# Patient Record
Sex: FEMALE | Race: OTHER | NOT HISPANIC OR LATINO | URBAN - METROPOLITAN AREA
[De-identification: names, ages, dates, MRNs, and addresses within clinical notes are randomized per-mention and may not be internally consistent; named-entity substitution may affect disease eponyms.]

---

## 2018-04-11 ENCOUNTER — OUTPATIENT (OUTPATIENT)
Dept: OUTPATIENT SERVICES | Facility: HOSPITAL | Age: 63
LOS: 1 days | Discharge: HOME | End: 2018-04-11

## 2018-04-11 VITALS
SYSTOLIC BLOOD PRESSURE: 143 MMHG | WEIGHT: 167.77 LBS | OXYGEN SATURATION: 99 % | HEART RATE: 66 BPM | RESPIRATION RATE: 15 BRPM | HEIGHT: 62 IN | DIASTOLIC BLOOD PRESSURE: 63 MMHG | TEMPERATURE: 98 F

## 2018-04-11 DIAGNOSIS — Z98.890 OTHER SPECIFIED POSTPROCEDURAL STATES: Chronic | ICD-10-CM

## 2018-04-11 LAB
ANION GAP SERPL CALC-SCNC: 11 MMOL/L — SIGNIFICANT CHANGE UP (ref 7–14)
APPEARANCE UR: CLEAR — SIGNIFICANT CHANGE UP
APTT BLD: 28.8 SEC — SIGNIFICANT CHANGE UP (ref 27–39.2)
BASOPHILS # BLD AUTO: 0.08 K/UL — SIGNIFICANT CHANGE UP (ref 0–0.2)
BASOPHILS NFR BLD AUTO: 0.8 % — SIGNIFICANT CHANGE UP (ref 0–1)
BILIRUB UR-MCNC: NEGATIVE — SIGNIFICANT CHANGE UP
BLD GP AB SCN SERPL QL: SIGNIFICANT CHANGE UP
BUN SERPL-MCNC: 17 MG/DL — SIGNIFICANT CHANGE UP (ref 10–20)
CALCIUM SERPL-MCNC: 9 MG/DL — SIGNIFICANT CHANGE UP (ref 8.5–10.1)
CHLORIDE SERPL-SCNC: 101 MMOL/L — SIGNIFICANT CHANGE UP (ref 98–110)
CO2 SERPL-SCNC: 29 MMOL/L — SIGNIFICANT CHANGE UP (ref 17–32)
COLOR SPEC: YELLOW — SIGNIFICANT CHANGE UP
CREAT SERPL-MCNC: 0.7 MG/DL — SIGNIFICANT CHANGE UP (ref 0.7–1.5)
DIFF PNL FLD: NEGATIVE — SIGNIFICANT CHANGE UP
EOSINOPHIL # BLD AUTO: 0.21 K/UL — SIGNIFICANT CHANGE UP (ref 0–0.7)
EOSINOPHIL NFR BLD AUTO: 2.1 % — SIGNIFICANT CHANGE UP (ref 0–8)
GLUCOSE SERPL-MCNC: 99 MG/DL — SIGNIFICANT CHANGE UP (ref 70–99)
GLUCOSE UR QL: NEGATIVE MG/DL — SIGNIFICANT CHANGE UP
HCT VFR BLD CALC: 36.6 % — LOW (ref 37–47)
HGB BLD-MCNC: 12.1 G/DL — SIGNIFICANT CHANGE UP (ref 12–16)
IMM GRANULOCYTES NFR BLD AUTO: 0.4 % — HIGH (ref 0.1–0.3)
INR BLD: 0.88 RATIO — SIGNIFICANT CHANGE UP (ref 0.65–1.3)
KETONES UR-MCNC: NEGATIVE — SIGNIFICANT CHANGE UP
LEUKOCYTE ESTERASE UR-ACNC: NEGATIVE — SIGNIFICANT CHANGE UP
LYMPHOCYTES # BLD AUTO: 3.07 K/UL — SIGNIFICANT CHANGE UP (ref 1.2–3.4)
LYMPHOCYTES # BLD AUTO: 30.3 % — SIGNIFICANT CHANGE UP (ref 20.5–51.1)
MCHC RBC-ENTMCNC: 30.1 PG — SIGNIFICANT CHANGE UP (ref 27–31)
MCHC RBC-ENTMCNC: 33.1 G/DL — SIGNIFICANT CHANGE UP (ref 32–37)
MCV RBC AUTO: 91 FL — SIGNIFICANT CHANGE UP (ref 81–99)
MONOCYTES # BLD AUTO: 0.81 K/UL — HIGH (ref 0.1–0.6)
MONOCYTES NFR BLD AUTO: 8 % — SIGNIFICANT CHANGE UP (ref 1.7–9.3)
NEUTROPHILS # BLD AUTO: 5.92 K/UL — SIGNIFICANT CHANGE UP (ref 1.4–6.5)
NEUTROPHILS NFR BLD AUTO: 58.4 % — SIGNIFICANT CHANGE UP (ref 42.2–75.2)
NITRITE UR-MCNC: NEGATIVE — SIGNIFICANT CHANGE UP
NRBC # BLD: 0 /100 WBCS — SIGNIFICANT CHANGE UP (ref 0–0)
PH UR: 7 — SIGNIFICANT CHANGE UP (ref 5–8)
PLATELET # BLD AUTO: 354 K/UL — SIGNIFICANT CHANGE UP (ref 130–400)
POTASSIUM SERPL-MCNC: 4.5 MMOL/L — SIGNIFICANT CHANGE UP (ref 3.5–5)
POTASSIUM SERPL-SCNC: 4.5 MMOL/L — SIGNIFICANT CHANGE UP (ref 3.5–5)
PROT UR-MCNC: NEGATIVE MG/DL — SIGNIFICANT CHANGE UP
PROTHROM AB SERPL-ACNC: 9.5 SEC — LOW (ref 9.95–12.87)
RBC # BLD: 4.02 M/UL — LOW (ref 4.2–5.4)
RBC # FLD: 14.7 % — HIGH (ref 11.5–14.5)
SODIUM SERPL-SCNC: 141 MMOL/L — SIGNIFICANT CHANGE UP (ref 135–146)
SP GR SPEC: 1.01 — SIGNIFICANT CHANGE UP (ref 1.01–1.03)
TYPE + AB SCN PNL BLD: SIGNIFICANT CHANGE UP
UROBILINOGEN FLD QL: 0.2 MG/DL — SIGNIFICANT CHANGE UP (ref 0.2–0.2)
WBC # BLD: 10.13 K/UL — SIGNIFICANT CHANGE UP (ref 4.8–10.8)
WBC # FLD AUTO: 10.13 K/UL — SIGNIFICANT CHANGE UP (ref 4.8–10.8)

## 2018-04-11 NOTE — H&P PST ADULT - REASON FOR ADMISSION
PT DENIES HAVING SOB, CP, PALPITATIONS, DYSURIA, UTI, URI AT PRESENT  EXERCISE TOLERANCE  2-3 FOS NO SOB.  PT DENIES HAVING ANY RASHES, BRUISES OR OPEN WOUNDS AT PRESENT.  AS PER THE PT, THIS IS HIS/HER COMPLETE MEDICAL AND SURGICAL HX, INCLUDING MEDICATIONS PRESCRIBED AND OVER THE COUNTER  PT PRESENTS TO PAST WITH H/O- LEFT KNEE OA.   PT SCHEDULED FOR LEFT- TKR. PT DENIES HAVING SOB, CP, PALPITATIONS, DYSURIA, UTI, URI AT PRESENT  EXERCISE TOLERANCE - 1 FOS NO SOB.  PT DENIES HAVING ANY RASHES, BRUISES OR OPEN WOUNDS AT PRESENT.  AS PER THE PT, THIS IS HIS/HER COMPLETE MEDICAL AND SURGICAL HX, INCLUDING MEDICATIONS PRESCRIBED AND OVER THE COUNTER  PT PRESENTS TO PAST WITH H/O- LEFT KNEE OA.   PT SCHEDULED FOR LEFT- TKR.

## 2018-04-11 NOTE — H&P PST ADULT - PMH
Arthritis  LOWER BACK  Back pain, unspecified back location, unspecified back pain laterality, unspecified chronicity    HTN (hypertension) Arthritis  LOWER BACK  Asthma  LAST EPISODE > 20YRS AGO  Back pain, unspecified back location, unspecified back pain laterality, unspecified chronicity    HTN (hypertension)

## 2018-04-11 NOTE — H&P PST ADULT - NSANTHOSAYNRD_GEN_A_CORE
No. TRICE screening performed.  STOP BANG Legend: 0-2 = LOW Risk; 3-4 = INTERMEDIATE Risk; 5-8 = HIGH Risk

## 2018-04-12 DIAGNOSIS — Z01.818 ENCOUNTER FOR OTHER PREPROCEDURAL EXAMINATION: ICD-10-CM

## 2018-04-12 DIAGNOSIS — M17.12 UNILATERAL PRIMARY OSTEOARTHRITIS, LEFT KNEE: ICD-10-CM

## 2018-04-12 LAB
CULTURE RESULTS: NO GROWTH — SIGNIFICANT CHANGE UP
SPECIMEN SOURCE: SIGNIFICANT CHANGE UP

## 2018-04-16 ENCOUNTER — INPATIENT (INPATIENT)
Facility: HOSPITAL | Age: 63
LOS: 3 days | Discharge: HOME | End: 2018-04-20
Attending: ORTHOPAEDIC SURGERY | Admitting: ORTHOPAEDIC SURGERY

## 2018-04-16 VITALS
HEART RATE: 84 BPM | DIASTOLIC BLOOD PRESSURE: 85 MMHG | RESPIRATION RATE: 18 BRPM | TEMPERATURE: 98 F | SYSTOLIC BLOOD PRESSURE: 149 MMHG | HEIGHT: 62 IN | WEIGHT: 177.03 LBS

## 2018-04-16 DIAGNOSIS — Z98.890 OTHER SPECIFIED POSTPROCEDURAL STATES: Chronic | ICD-10-CM

## 2018-04-16 RX ORDER — MORPHINE SULFATE 50 MG/1
2 CAPSULE, EXTENDED RELEASE ORAL
Qty: 0 | Refills: 0 | Status: DISCONTINUED | OUTPATIENT
Start: 2018-04-16 | End: 2018-04-16

## 2018-04-16 RX ORDER — MAGNESIUM CHLORIDE
400 CRYSTALS MISCELLANEOUS
Qty: 0 | Refills: 0 | COMMUNITY

## 2018-04-16 RX ORDER — SODIUM CHLORIDE 9 MG/ML
1000 INJECTION, SOLUTION INTRAVENOUS
Qty: 0 | Refills: 0 | Status: DISCONTINUED | OUTPATIENT
Start: 2018-04-16 | End: 2018-04-16

## 2018-04-16 RX ORDER — BENZOCAINE AND MENTHOL 5; 1 G/100ML; G/100ML
1 LIQUID ORAL
Qty: 0 | Refills: 0 | Status: DISCONTINUED | OUTPATIENT
Start: 2018-04-16 | End: 2018-04-20

## 2018-04-16 RX ORDER — MONTELUKAST 4 MG/1
10 TABLET, CHEWABLE ORAL DAILY
Qty: 0 | Refills: 0 | Status: DISCONTINUED | OUTPATIENT
Start: 2018-04-16 | End: 2018-04-20

## 2018-04-16 RX ORDER — FLUTICASONE PROPIONATE 220 MCG
1 AEROSOL WITH ADAPTER (GRAM) INHALATION
Qty: 0 | Refills: 0 | COMMUNITY

## 2018-04-16 RX ORDER — GABAPENTIN 400 MG/1
100 CAPSULE ORAL EVERY 8 HOURS
Qty: 0 | Refills: 0 | Status: DISCONTINUED | OUTPATIENT
Start: 2018-04-16 | End: 2018-04-20

## 2018-04-16 RX ORDER — ALPRAZOLAM 0.25 MG
0.25 TABLET ORAL EVERY 12 HOURS
Qty: 0 | Refills: 0 | Status: DISCONTINUED | OUTPATIENT
Start: 2018-04-16 | End: 2018-04-20

## 2018-04-16 RX ORDER — OXYCODONE HYDROCHLORIDE 5 MG/1
5 TABLET ORAL EVERY 4 HOURS
Qty: 0 | Refills: 0 | Status: DISCONTINUED | OUTPATIENT
Start: 2018-04-16 | End: 2018-04-20

## 2018-04-16 RX ORDER — DOCUSATE SODIUM 100 MG
100 CAPSULE ORAL THREE TIMES A DAY
Qty: 0 | Refills: 0 | Status: DISCONTINUED | OUTPATIENT
Start: 2018-04-16 | End: 2018-04-20

## 2018-04-16 RX ORDER — ALPRAZOLAM 0.25 MG
0.5 TABLET ORAL ONCE
Qty: 0 | Refills: 0 | Status: DISCONTINUED | OUTPATIENT
Start: 2018-04-16 | End: 2018-04-16

## 2018-04-16 RX ORDER — ACETAMINOPHEN 500 MG
650 TABLET ORAL EVERY 8 HOURS
Qty: 0 | Refills: 0 | Status: DISCONTINUED | OUTPATIENT
Start: 2018-04-16 | End: 2018-04-20

## 2018-04-16 RX ORDER — OMEPRAZOLE 10 MG/1
1 CAPSULE, DELAYED RELEASE ORAL
Qty: 0 | Refills: 0 | COMMUNITY

## 2018-04-16 RX ORDER — ONDANSETRON 8 MG/1
4 TABLET, FILM COATED ORAL ONCE
Qty: 0 | Refills: 0 | Status: DISCONTINUED | OUTPATIENT
Start: 2018-04-16 | End: 2018-04-16

## 2018-04-16 RX ORDER — CALCIUM CARBONATE 500(1250)
1 TABLET ORAL
Qty: 0 | Refills: 0 | COMMUNITY

## 2018-04-16 RX ORDER — OXYCODONE HYDROCHLORIDE 5 MG/1
10 TABLET ORAL EVERY 4 HOURS
Qty: 0 | Refills: 0 | Status: DISCONTINUED | OUTPATIENT
Start: 2018-04-16 | End: 2018-04-20

## 2018-04-16 RX ORDER — MORPHINE SULFATE 50 MG/1
4 CAPSULE, EXTENDED RELEASE ORAL
Qty: 0 | Refills: 0 | Status: DISCONTINUED | OUTPATIENT
Start: 2018-04-16 | End: 2018-04-16

## 2018-04-16 RX ORDER — BUDESONIDE AND FORMOTEROL FUMARATE DIHYDRATE 160; 4.5 UG/1; UG/1
2 AEROSOL RESPIRATORY (INHALATION)
Qty: 0 | Refills: 0 | Status: DISCONTINUED | OUTPATIENT
Start: 2018-04-16 | End: 2018-04-20

## 2018-04-16 RX ORDER — SODIUM CHLORIDE 9 MG/ML
1000 INJECTION INTRAMUSCULAR; INTRAVENOUS; SUBCUTANEOUS
Qty: 0 | Refills: 0 | Status: DISCONTINUED | OUTPATIENT
Start: 2018-04-16 | End: 2018-04-20

## 2018-04-16 RX ORDER — LORATADINE 10 MG/1
10 TABLET ORAL DAILY
Qty: 0 | Refills: 0 | Status: DISCONTINUED | OUTPATIENT
Start: 2018-04-16 | End: 2018-04-20

## 2018-04-16 RX ORDER — MEPERIDINE HYDROCHLORIDE 50 MG/ML
12.5 INJECTION INTRAMUSCULAR; INTRAVENOUS; SUBCUTANEOUS
Qty: 0 | Refills: 0 | Status: DISCONTINUED | OUTPATIENT
Start: 2018-04-16 | End: 2018-04-16

## 2018-04-16 RX ORDER — PREGABALIN 225 MG/1
1 CAPSULE ORAL
Qty: 0 | Refills: 0 | COMMUNITY

## 2018-04-16 RX ORDER — SENNA PLUS 8.6 MG/1
1 TABLET ORAL
Qty: 0 | Refills: 0 | Status: DISCONTINUED | OUTPATIENT
Start: 2018-04-16 | End: 2018-04-20

## 2018-04-16 RX ORDER — CEFAZOLIN SODIUM 1 G
1000 VIAL (EA) INJECTION EVERY 6 HOURS
Qty: 0 | Refills: 0 | Status: COMPLETED | OUTPATIENT
Start: 2018-04-16 | End: 2018-04-17

## 2018-04-16 RX ORDER — PANTOPRAZOLE SODIUM 20 MG/1
40 TABLET, DELAYED RELEASE ORAL
Qty: 0 | Refills: 0 | Status: DISCONTINUED | OUTPATIENT
Start: 2018-04-16 | End: 2018-04-20

## 2018-04-16 RX ORDER — ENOXAPARIN SODIUM 100 MG/ML
40 INJECTION SUBCUTANEOUS ONCE
Qty: 0 | Refills: 0 | Status: COMPLETED | OUTPATIENT
Start: 2018-04-17 | End: 2018-04-17

## 2018-04-16 RX ORDER — ALBUTEROL 90 UG/1
0 AEROSOL, METERED ORAL
Qty: 0 | Refills: 0 | COMMUNITY

## 2018-04-16 RX ORDER — ASPIRIN/CALCIUM CARB/MAGNESIUM 324 MG
325 TABLET ORAL EVERY 12 HOURS
Qty: 0 | Refills: 0 | Status: DISCONTINUED | OUTPATIENT
Start: 2018-04-17 | End: 2018-04-20

## 2018-04-16 RX ORDER — BUDESONIDE AND FORMOTEROL FUMARATE DIHYDRATE 160; 4.5 UG/1; UG/1
2 AEROSOL RESPIRATORY (INHALATION)
Qty: 0 | Refills: 0 | COMMUNITY

## 2018-04-16 RX ORDER — KETOROLAC TROMETHAMINE 30 MG/ML
15 SYRINGE (ML) INJECTION EVERY 8 HOURS
Qty: 0 | Refills: 0 | Status: DISCONTINUED | OUTPATIENT
Start: 2018-04-16 | End: 2018-04-17

## 2018-04-16 RX ORDER — ALBUTEROL 90 UG/1
1 AEROSOL, METERED ORAL DAILY
Qty: 0 | Refills: 0 | Status: DISCONTINUED | OUTPATIENT
Start: 2018-04-16 | End: 2018-04-20

## 2018-04-16 RX ORDER — MORPHINE SULFATE 50 MG/1
2 CAPSULE, EXTENDED RELEASE ORAL EVERY 4 HOURS
Qty: 0 | Refills: 0 | Status: DISCONTINUED | OUTPATIENT
Start: 2018-04-16 | End: 2018-04-20

## 2018-04-16 RX ORDER — PREGABALIN 225 MG/1
500 CAPSULE ORAL DAILY
Qty: 0 | Refills: 0 | Status: DISCONTINUED | OUTPATIENT
Start: 2018-04-16 | End: 2018-04-20

## 2018-04-16 RX ORDER — METOCLOPRAMIDE HCL 10 MG
10 TABLET ORAL EVERY 6 HOURS
Qty: 0 | Refills: 0 | Status: DISCONTINUED | OUTPATIENT
Start: 2018-04-16 | End: 2018-04-20

## 2018-04-16 RX ORDER — LORATADINE 10 MG/1
1 TABLET ORAL
Qty: 0 | Refills: 0 | COMMUNITY

## 2018-04-16 RX ORDER — MONTELUKAST 4 MG/1
1 TABLET, CHEWABLE ORAL
Qty: 0 | Refills: 0 | COMMUNITY

## 2018-04-16 RX ORDER — FERROUS SULFATE 325(65) MG
325 TABLET ORAL
Qty: 0 | Refills: 0 | Status: DISCONTINUED | OUTPATIENT
Start: 2018-04-16 | End: 2018-04-20

## 2018-04-16 RX ADMIN — MORPHINE SULFATE 4 MILLIGRAM(S): 50 CAPSULE, EXTENDED RELEASE ORAL at 19:00

## 2018-04-16 RX ADMIN — Medication 0.5 MILLIGRAM(S): at 20:05

## 2018-04-16 RX ADMIN — Medication 100 MILLIGRAM(S): at 22:59

## 2018-04-16 RX ADMIN — Medication 100 MILLIGRAM(S): at 22:58

## 2018-04-16 RX ADMIN — BENZOCAINE AND MENTHOL 1 LOZENGE: 5; 1 LIQUID ORAL at 23:37

## 2018-04-16 RX ADMIN — MEPERIDINE HYDROCHLORIDE 12.5 MILLIGRAM(S): 50 INJECTION INTRAMUSCULAR; INTRAVENOUS; SUBCUTANEOUS at 18:15

## 2018-04-16 RX ADMIN — MEPERIDINE HYDROCHLORIDE 12.5 MILLIGRAM(S): 50 INJECTION INTRAMUSCULAR; INTRAVENOUS; SUBCUTANEOUS at 18:25

## 2018-04-16 RX ADMIN — GABAPENTIN 100 MILLIGRAM(S): 400 CAPSULE ORAL at 23:03

## 2018-04-16 RX ADMIN — Medication 650 MILLIGRAM(S): at 22:58

## 2018-04-16 RX ADMIN — SODIUM CHLORIDE 120 MILLILITER(S): 9 INJECTION, SOLUTION INTRAVENOUS at 18:39

## 2018-04-16 RX ADMIN — MORPHINE SULFATE 4 MILLIGRAM(S): 50 CAPSULE, EXTENDED RELEASE ORAL at 18:05

## 2018-04-16 RX ADMIN — MORPHINE SULFATE 4 MILLIGRAM(S): 50 CAPSULE, EXTENDED RELEASE ORAL at 18:15

## 2018-04-16 RX ADMIN — Medication 10 MILLIGRAM(S): at 23:40

## 2018-04-16 RX ADMIN — Medication 15 MILLIGRAM(S): at 23:01

## 2018-04-16 NOTE — ASU PATIENT PROFILE, ADULT - PMH
Arthritis  LOWER BACK  Asthma  LAST EPISODE > 20YRS AGO  Back pain, unspecified back location, unspecified back pain laterality, unspecified chronicity    HTN (hypertension)

## 2018-04-16 NOTE — PROGRESS NOTE ADULT - SUBJECTIVE AND OBJECTIVE BOX
pt seems very anxious no chest pain sob   Vital Signs Last 24 Hrs  T(C): 37 (16 Apr 2018 19:30), Max: 37 (16 Apr 2018 19:30)  T(F): 98.6 (16 Apr 2018 19:30), Max: 98.6 (16 Apr 2018 19:30)  HR: 103 (16 Apr 2018 20:00) (84 - 107)  BP: 134/76 (16 Apr 2018 20:00) (120/74 - 149/85)  BP(mean): --  RR: 23 (16 Apr 2018 20:00) (16 - 25)  SpO2: 99% (16 Apr 2018 20:00) (98% - 100%)  ekg preop hr81   post op in the pacu 105 sinus  xanax given   1 hour later pt much better hr under 100   bp stable  am labs continue obs

## 2018-04-16 NOTE — CHART NOTE - NSCHARTNOTEFT_GEN_A_CORE
PACU ANESTHESIA ADMISSION NOTE      Procedure: Left total knee replacement  Post op diagnosis:  left osteoarthritis    ____  Intubated  TV:______       Rate: ______      FiO2: ______    _x___  Patent Airway    _x___  Full return of protective reflexes    __  Full recovery from anesthesia / back to baseline status    Vitals:  T97.9  HR: 106  BP: 134/75  RR: 21  SpO2: 98    Mental Status:  _x___ Awake   _____ Alert   _____ Drowsy   _____ Sedated    Nausea/Vomiting:  _x___  NO       ______Yes,   See Post - Op Orders         Pain Scale (0-10):  __0___    Treatment: _x___ None    ____ See Post - Op/PCA Orders    Post - Operative Fluids:   __x__ Oral   ____ See Post - Op Orders    Plan: Discharge:   __Home       __x___Floor     _____Critical Care    _____  Other:_________________    Comments: spontaneously breathing hemodynamically stable   No anesthesia issues or complications noted.  Discharge to floor when criteria met.

## 2018-04-16 NOTE — PROGRESS NOTE ADULT - SUBJECTIVE AND OBJECTIVE BOX
TKA ORTHOPEDIC POST OP CHECK    T(C): 36.5 (04-16-18 @ 17:54), Max: 36.5 (04-16-18 @ 17:54)  HR: 85 (04-16-18 @ 18:45) (84 - 107)  BP: 137/73 (04-16-18 @ 18:45) (134/75 - 149/85)  RR: 16 (04-16-18 @ 18:45) (16 - 25)  SpO2: 100% (04-16-18 @ 18:30) (100% - 100%)      p[reop h/h 12/36          S/P TKA ARTHROPLASTY  PAIN CONTROLLED  VSS   A&O X3  DRESSING C/D/I  NVI  ANTIBIOTICS INFUSED  DVT PROPHYLAXIS ORDERED ASA  ENCOURAGE INCENTIVE SPIROMETRY  AM LABS  REHAB TO BEGIN IN THE AM WBAT  D/C PLANNING

## 2018-04-17 LAB
ANION GAP SERPL CALC-SCNC: 16 MMOL/L — HIGH (ref 7–14)
BASOPHILS # BLD AUTO: 0.01 K/UL — SIGNIFICANT CHANGE UP (ref 0–0.2)
BASOPHILS NFR BLD AUTO: 0.1 % — SIGNIFICANT CHANGE UP (ref 0–1)
BUN SERPL-MCNC: 12 MG/DL — SIGNIFICANT CHANGE UP (ref 10–20)
CALCIUM SERPL-MCNC: 8.7 MG/DL — SIGNIFICANT CHANGE UP (ref 8.5–10.1)
CHLORIDE SERPL-SCNC: 101 MMOL/L — SIGNIFICANT CHANGE UP (ref 98–110)
CO2 SERPL-SCNC: 22 MMOL/L — SIGNIFICANT CHANGE UP (ref 17–32)
CREAT SERPL-MCNC: 0.8 MG/DL — SIGNIFICANT CHANGE UP (ref 0.7–1.5)
EOSINOPHIL # BLD AUTO: 0 K/UL — SIGNIFICANT CHANGE UP (ref 0–0.7)
EOSINOPHIL NFR BLD AUTO: 0 % — SIGNIFICANT CHANGE UP (ref 0–8)
GLUCOSE SERPL-MCNC: 172 MG/DL — HIGH (ref 70–99)
HCT VFR BLD CALC: 36.2 % — LOW (ref 37–47)
HGB BLD-MCNC: 11.6 G/DL — LOW (ref 12–16)
IMM GRANULOCYTES NFR BLD AUTO: 0.4 % — HIGH (ref 0.1–0.3)
LYMPHOCYTES # BLD AUTO: 0.88 K/UL — LOW (ref 1.2–3.4)
LYMPHOCYTES # BLD AUTO: 7.2 % — LOW (ref 20.5–51.1)
MCHC RBC-ENTMCNC: 29.5 PG — SIGNIFICANT CHANGE UP (ref 27–31)
MCHC RBC-ENTMCNC: 32 G/DL — SIGNIFICANT CHANGE UP (ref 32–37)
MCV RBC AUTO: 92.1 FL — SIGNIFICANT CHANGE UP (ref 81–99)
MONOCYTES # BLD AUTO: 0.22 K/UL — SIGNIFICANT CHANGE UP (ref 0.1–0.6)
MONOCYTES NFR BLD AUTO: 1.8 % — SIGNIFICANT CHANGE UP (ref 1.7–9.3)
NEUTROPHILS # BLD AUTO: 11.1 K/UL — HIGH (ref 1.4–6.5)
NEUTROPHILS NFR BLD AUTO: 90.5 % — HIGH (ref 42.2–75.2)
NRBC # BLD: 0 /100 WBCS — SIGNIFICANT CHANGE UP (ref 0–0)
PLATELET # BLD AUTO: 291 K/UL — SIGNIFICANT CHANGE UP (ref 130–400)
POTASSIUM SERPL-MCNC: 4.8 MMOL/L — SIGNIFICANT CHANGE UP (ref 3.5–5)
POTASSIUM SERPL-SCNC: 4.8 MMOL/L — SIGNIFICANT CHANGE UP (ref 3.5–5)
RBC # BLD: 3.93 M/UL — LOW (ref 4.2–5.4)
RBC # FLD: 14.7 % — HIGH (ref 11.5–14.5)
SODIUM SERPL-SCNC: 139 MMOL/L — SIGNIFICANT CHANGE UP (ref 135–146)
WBC # BLD: 12.26 K/UL — HIGH (ref 4.8–10.8)
WBC # FLD AUTO: 12.26 K/UL — HIGH (ref 4.8–10.8)

## 2018-04-17 RX ADMIN — Medication 100 MILLIGRAM(S): at 05:21

## 2018-04-17 RX ADMIN — Medication 650 MILLIGRAM(S): at 21:24

## 2018-04-17 RX ADMIN — BENZOCAINE AND MENTHOL 1 LOZENGE: 5; 1 LIQUID ORAL at 05:24

## 2018-04-17 RX ADMIN — ALBUTEROL 1 PUFF(S): 90 AEROSOL, METERED ORAL at 09:25

## 2018-04-17 RX ADMIN — Medication 325 MILLIGRAM(S): at 17:11

## 2018-04-17 RX ADMIN — Medication 1 TABLET(S): at 11:00

## 2018-04-17 RX ADMIN — Medication 650 MILLIGRAM(S): at 05:21

## 2018-04-17 RX ADMIN — OXYCODONE HYDROCHLORIDE 10 MILLIGRAM(S): 5 TABLET ORAL at 13:36

## 2018-04-17 RX ADMIN — BENZOCAINE AND MENTHOL 1 LOZENGE: 5; 1 LIQUID ORAL at 17:12

## 2018-04-17 RX ADMIN — Medication 15 MILLIGRAM(S): at 14:20

## 2018-04-17 RX ADMIN — PREGABALIN 500 MICROGRAM(S): 225 CAPSULE ORAL at 11:00

## 2018-04-17 RX ADMIN — SENNA PLUS 1 TABLET(S): 8.6 TABLET ORAL at 05:21

## 2018-04-17 RX ADMIN — GABAPENTIN 100 MILLIGRAM(S): 400 CAPSULE ORAL at 13:36

## 2018-04-17 RX ADMIN — BENZOCAINE AND MENTHOL 1 LOZENGE: 5; 1 LIQUID ORAL at 11:01

## 2018-04-17 RX ADMIN — SENNA PLUS 1 TABLET(S): 8.6 TABLET ORAL at 17:10

## 2018-04-17 RX ADMIN — Medication 100 MILLIGRAM(S): at 13:36

## 2018-04-17 RX ADMIN — Medication 325 MILLIGRAM(S): at 08:40

## 2018-04-17 RX ADMIN — LORATADINE 10 MILLIGRAM(S): 10 TABLET ORAL at 11:01

## 2018-04-17 RX ADMIN — OXYCODONE HYDROCHLORIDE 10 MILLIGRAM(S): 5 TABLET ORAL at 10:39

## 2018-04-17 RX ADMIN — Medication 15 MILLIGRAM(S): at 13:33

## 2018-04-17 RX ADMIN — Medication 650 MILLIGRAM(S): at 21:25

## 2018-04-17 RX ADMIN — GABAPENTIN 100 MILLIGRAM(S): 400 CAPSULE ORAL at 21:24

## 2018-04-17 RX ADMIN — MONTELUKAST 10 MILLIGRAM(S): 4 TABLET, CHEWABLE ORAL at 11:00

## 2018-04-17 RX ADMIN — Medication 0.25 MILLIGRAM(S): at 05:24

## 2018-04-17 RX ADMIN — ENOXAPARIN SODIUM 40 MILLIGRAM(S): 100 INJECTION SUBCUTANEOUS at 00:14

## 2018-04-17 RX ADMIN — Medication 325 MILLIGRAM(S): at 05:21

## 2018-04-17 RX ADMIN — Medication 100 MILLIGRAM(S): at 05:22

## 2018-04-17 RX ADMIN — Medication 325 MILLIGRAM(S): at 17:10

## 2018-04-17 RX ADMIN — Medication 10 MILLIGRAM(S): at 05:20

## 2018-04-17 RX ADMIN — Medication 650 MILLIGRAM(S): at 13:35

## 2018-04-17 RX ADMIN — Medication 100 MILLIGRAM(S): at 21:23

## 2018-04-17 RX ADMIN — BUDESONIDE AND FORMOTEROL FUMARATE DIHYDRATE 2 PUFF(S): 160; 4.5 AEROSOL RESPIRATORY (INHALATION) at 21:23

## 2018-04-17 RX ADMIN — BUDESONIDE AND FORMOTEROL FUMARATE DIHYDRATE 2 PUFF(S): 160; 4.5 AEROSOL RESPIRATORY (INHALATION) at 08:41

## 2018-04-17 RX ADMIN — SODIUM CHLORIDE 50 MILLILITER(S): 9 INJECTION INTRAMUSCULAR; INTRAVENOUS; SUBCUTANEOUS at 00:15

## 2018-04-17 RX ADMIN — Medication 0.25 MILLIGRAM(S): at 17:12

## 2018-04-17 RX ADMIN — PANTOPRAZOLE SODIUM 40 MILLIGRAM(S): 20 TABLET, DELAYED RELEASE ORAL at 08:40

## 2018-04-17 RX ADMIN — Medication 325 MILLIGRAM(S): at 13:35

## 2018-04-17 RX ADMIN — GABAPENTIN 100 MILLIGRAM(S): 400 CAPSULE ORAL at 05:21

## 2018-04-17 RX ADMIN — Medication 650 MILLIGRAM(S): at 14:20

## 2018-04-17 RX ADMIN — Medication 15 MILLIGRAM(S): at 05:21

## 2018-04-17 NOTE — PHYSICAL THERAPY INITIAL EVALUATION ADULT - GENERAL OBSERVATIONS, REHAB EVAL
10:30-11:10; Pt encountered in bed, agreeable to PT. + hemovac, IVL by JULIETTE Puente, pain meds administered. + O2, SpO: 99%, SpO2 on RA after amb: 99%. JULIETTE Puente aware O2 removed.

## 2018-04-17 NOTE — OCCUPATIONAL THERAPY INITIAL EVALUATION ADULT - GENERAL OBSERVATIONS, REHAB EVAL
Pt. encountered semi sweet in NAD. + IV, + Bed Alarm. Pt. cooperative to OT evaluation. Seen 13:20-13:53

## 2018-04-17 NOTE — CONSULT NOTE ADULT - ASSESSMENT
IMPRESSION: Rehab of L TKR    PRECAUTIONS: [    ] Cardiac  [    ] Respiratory  [    ] Seizures [    ] Contact Isolation  [    ] Droplet Isolation  [    ] Other    Weight Bearing Status:     RECOMMENDATION:    Out of Bed to Chair     DVT/Decubiti Prophylaxis    REHAB PLAN:     [   x  ] Bedside P/T 3-5 times a week   [ x    ] Bedside O/T  2-3 times a week   [     ] No Rehab Therapy Indicated   [     ]  Speech Therapy   Conditioning/ROM                                 ADL  Bed Mobility                                            Conditioning/ROM  Transfers                                                  Bed Mobility  Sitting /Standing Balance                      Transfers                                        Gait Training                                            Sitting/Standing Balance  Stair Training [   ]Applicable                 Home equipment Eval                                                                     Splinting  [   ] Only      GOALS:   ADL   [x    ]   Independent         Transfers  [  x  ] Independent            Ambulation  [  x   ] Independent     [x     ] With device                            [    ]  CG                                               [    ]  CG                                                    [     ] CG                            [    ] Min A                                          [    ] Min A                                                [     ] Min  A          DISCHARGE PLAN:   [     ]  Good candidate for Intensive Rehabilitation/Hospital based-[facility]                                             Will tolerate 3hrs Intensive Rehab Daily                                       [      ]  Short Term Rehab in Skilled Nursing Facility                                       [   x   ]  Home with Outpatient or VN services                                         [      ]  Possible Candidate for Intensive Hospital based Rehab

## 2018-04-17 NOTE — PROGRESS NOTE ADULT - SUBJECTIVE AND OBJECTIVE BOX
ORTHO PROGRESS NOTE       63yFemale POD # 1      S/P  left TKA     Patient seen and examined at bedside . The patient is awake and alert in NAD. No complaints of chest pain, SOB, N/V.    Vital Signs Last 24 Hrs  T(C): 36.4 (17 Apr 2018 07:27), Max: 37 (16 Apr 2018 19:30)  T(F): 97.5 (17 Apr 2018 07:27), Max: 98.6 (16 Apr 2018 19:30)  HR: 107 (17 Apr 2018 07:27) (84 - 107)  BP: 112/58 (17 Apr 2018 07:27) (108/60 - 149/85)  BP(mean): --  RR: 18 (17 Apr 2018 07:27) (15 - 90)  SpO2: 99% (16 Apr 2018 21:00) (98% - 100%)                          11.6   12.26 )-----------( 291      ( 17 Apr 2018 02:21 )             36.2     04-17    139  |  101  |  12  ----------------------------<  172<H>  4.8   |  22  |  0.8    Ca    8.7      17 Apr 2018 02:21            DVT ppx : aspirin     MEDICATIONS  (STANDING):  acetaminophen   Tablet. 650 milliGRAM(s) Oral every 8 hours  ALBUTerol    90 MICROgram(s) HFA Inhaler 1 Puff(s) Inhalation daily  ALPRAZolam 0.25 milliGRAM(s) Oral every 12 hours  aspirin enteric coated 325 milliGRAM(s) Oral every 12 hours  benzocaine 15 mG/menthol 3.6 mG Lozenge 1 Lozenge Oral four times a day  buDESOnide 160 MICROgram(s)/formoterol 4.5 MICROgram(s) Inhaler 2 Puff(s) Inhalation two times a day  buDESOnide 160 MICROgram(s)/formoterol 4.5 MICROgram(s) Inhaler 2 Puff(s) Inhalation two times a day  cyanocobalamin 500 MICROGram(s) Oral daily  docusate sodium 100 milliGRAM(s) Oral three times a day  enalapril 10 milliGRAM(s) Oral daily  ferrous    sulfate 325 milliGRAM(s) Oral three times a day with meals  gabapentin 100 milliGRAM(s) Oral every 8 hours  ketorolac   Injectable 15 milliGRAM(s) IV Push every 8 hours  loratadine 10 milliGRAM(s) Oral daily  montelukast 10 milliGRAM(s) Oral daily  multivitamin 1 Tablet(s) Oral daily  pantoprazole   Suspension 40 milliGRAM(s) Oral before breakfast  senna 1 Tablet(s) Oral two times a day  sodium chloride 0.9%. 1000 milliLiter(s) (50 mL/Hr) IV Continuous <Continuous>    MEDICATIONS  (PRN):  metoclopramide Injectable 10 milliGRAM(s) IV Push every 6 hours PRN Nausea and/or Vomiting  morphine  - Injectable 2 milliGRAM(s) IV Push every 4 hours PRN Severe Pain (7 - 10)  oxyCODONE    IR 10 milliGRAM(s) Oral every 4 hours PRN Moderate Pain  oxyCODONE    IR 5 milliGRAM(s) Oral every 4 hours PRN Mild Pain      PE:   left knee dressing C/D/I          Compartments soft         NVI, SILT           A/P: 63yFemale POD #1    S/P left TKA            OOB to Chair            Physical Therapy           Pain control            Incentive Spirometry            DVT Prophylaxis            Discharge planning

## 2018-04-17 NOTE — CONSULT NOTE ADULT - SUBJECTIVE AND OBJECTIVE BOX
Patient is a 63y old  Female who presents with a chief complaint of pain  L knee   HPI: S/P L TKR on 4/16      PAST MEDICAL & SURGICAL HISTORY:  Asthma: LAST EPISODE &gt; 20YRS AGO  Back pain, unspecified back location, unspecified back pain laterality, unspecified chronicity  HTN (hypertension)  Arthritis: LOWER BACK  History of surgery: CORNEA REPLACEMENT      Hospital Course: uncomplicated    TODAY'S SUBJECTIVE & REVIEW OF SYMPTOMS:     Constitutional WNL   Cardio WNL   Resp WNL   GI WNL  Heme WNL  Endo WNL  Skin WNL  MSK LBP Frequent epidurals- last one two weeks ago  Neuro paresthesias RLE  Cognitive WNL  Psych WNL      MEDICATIONS  (STANDING):  acetaminophen   Tablet. 650 milliGRAM(s) Oral every 8 hours  ALBUTerol    90 MICROgram(s) HFA Inhaler 1 Puff(s) Inhalation daily  ALPRAZolam 0.25 milliGRAM(s) Oral every 12 hours  aspirin enteric coated 325 milliGRAM(s) Oral every 12 hours  benzocaine 15 mG/menthol 3.6 mG Lozenge 1 Lozenge Oral four times a day  buDESOnide 160 MICROgram(s)/formoterol 4.5 MICROgram(s) Inhaler 2 Puff(s) Inhalation two times a day  buDESOnide 160 MICROgram(s)/formoterol 4.5 MICROgram(s) Inhaler 2 Puff(s) Inhalation two times a day  cyanocobalamin 500 MICROGram(s) Oral daily  docusate sodium 100 milliGRAM(s) Oral three times a day  enalapril 10 milliGRAM(s) Oral daily  ferrous    sulfate 325 milliGRAM(s) Oral three times a day with meals  gabapentin 100 milliGRAM(s) Oral every 8 hours  ketorolac   Injectable 15 milliGRAM(s) IV Push every 8 hours  loratadine 10 milliGRAM(s) Oral daily  montelukast 10 milliGRAM(s) Oral daily  multivitamin 1 Tablet(s) Oral daily  pantoprazole   Suspension 40 milliGRAM(s) Oral before breakfast  senna 1 Tablet(s) Oral two times a day  sodium chloride 0.9%. 1000 milliLiter(s) (50 mL/Hr) IV Continuous <Continuous>    MEDICATIONS  (PRN):  metoclopramide Injectable 10 milliGRAM(s) IV Push every 6 hours PRN Nausea and/or Vomiting  morphine  - Injectable 2 milliGRAM(s) IV Push every 4 hours PRN Severe Pain (7 - 10)  oxyCODONE    IR 10 milliGRAM(s) Oral every 4 hours PRN Moderate Pain  oxyCODONE    IR 5 milliGRAM(s) Oral every 4 hours PRN Mild Pain      FAMILY HISTORY:  No pertinent family history in first degree relatives      Allergies    latex (Short breath)  Vibramycin (Unknown)    Intolerances        SOCIAL HISTORY:    [    ] Etoh  [    ] Smoking  [    ] Substance abuse     Home Environment:  [    ] Home Alone  [   x ] Lives with Family  [    ] Home Health Aid    Dwelling:  [    ] Apartment  [  x  ] Private House  [    ] Adult Home  [    ] Skilled Nursing Facility      [    ] Short Term  [    ] Long Term  [ x   ] Stairs                           [    ] Elevator     FUNCTIONAL STATUS PTA: (Check all that apply)  Ambulation: [  x   ]Independent    [    ] Dependent     [    ] Non-Ambulatory  Assistive Device: [    ] SA Cane  [    ]  Q Cane  [    ] Walker  [    ]  Wheelchair  ADL : [    ] Independent  [    ]  Dependent   TO RETURN TO [facility] HOUSE ONLY 4 STEPS TO ENTER    Vital Signs Last 24 Hrs  T(C): 36.4 (17 Apr 2018 07:27), Max: 37 (16 Apr 2018 19:30)  T(F): 97.5 (17 Apr 2018 07:27), Max: 98.6 (16 Apr 2018 19:30)  HR: 107 (17 Apr 2018 07:27) (84 - 107)  BP: 112/58 (17 Apr 2018 07:27) (108/60 - 149/85)  BP(mean): --  RR: 18 (17 Apr 2018 07:27) (15 - 90)  SpO2: 99% (16 Apr 2018 21:00) (98% - 100%)      PHYSICAL EXAM: Alert & Oriented X3  GENERAL: NAD, well-groomed, well-developed  HEAD:  Atraumatic, Normocephalic  EYES: EOMI, PERRLA, conjunctiva and sclera clear  NECK: Supple, No JVD, Normal thyroid  CHEST/LUNG: Clear to percussion bilaterally; No rales, rhonchi, wheezing, or rubs  HEART: Regular rate and rhythm; No murmurs, rubs, or gallops  ABDOMEN: Soft, Nontender, Nondistended; Bowel sounds present  EXTREMITIES:  LLE in ACE, no calf tenderness    NERVOUS SYSTEM:  Cranial Nerves 2-12 intact [ x   ] Abnormal  [    ]  ROM: WFL all extremities [    ]  Abnormal [  x   ]lle not tested  Motor Strength: WFL all extremities  [    ]  Abnormal [  x  ]no SLR LLE, distally %/5  Sensation: intact to light touch [    ] Abnormal [ x   ]diminished RLE      FUNCTIONAL STATUS:  Bed Mobility: [   ]  Independent [    ]  Supervision [  x  ]  Needs Assistance [  ]  N/A  Transfers: [    ]  Independent [    ]  Supervision [  x  ]  Needs Assistance [    ]  N/A    Ambulation:  [    ]  Independent [    ]  Supervision [ x   ]  Needs Assistance [    ]  N/A   ADL:  [    ]   Independent [  x  ] Requires Assistance [    ] N/A   MIN A 20' RW x2 WITH PT    LABS:                        11.6   12.26 )-----------( 291      ( 17 Apr 2018 02:21 )             36.2     04-17    139  |  101  |  12  ----------------------------<  172<H>  4.8   |  22  |  0.8    Ca    8.7      17 Apr 2018 02:21            RADIOLOGY & ADDITIONAL STUDIES:

## 2018-04-18 LAB
BASE EXCESS BLDA CALC-SCNC: 2.3 MMOL/L — HIGH (ref -2–2)
HCO3 BLDA-SCNC: 26 MMOL/L — SIGNIFICANT CHANGE UP (ref 23–27)
HCT VFR BLD CALC: 31.6 % — LOW (ref 37–47)
HGB BLD-MCNC: 10.2 G/DL — LOW (ref 12–16)
MCHC RBC-ENTMCNC: 29.5 PG — SIGNIFICANT CHANGE UP (ref 27–31)
MCHC RBC-ENTMCNC: 32.3 G/DL — SIGNIFICANT CHANGE UP (ref 32–37)
MCV RBC AUTO: 91.3 FL — SIGNIFICANT CHANGE UP (ref 81–99)
NRBC # BLD: 0 /100 WBCS — SIGNIFICANT CHANGE UP (ref 0–0)
PCO2 BLDA: 37 MMHG — LOW (ref 38–42)
PH BLDA: 7.46 — HIGH (ref 7.38–7.42)
PLATELET # BLD AUTO: 304 K/UL — SIGNIFICANT CHANGE UP (ref 130–400)
PO2 BLDA: 100 MMHG — HIGH (ref 78–95)
RBC # BLD: 3.46 M/UL — LOW (ref 4.2–5.4)
RBC # FLD: 15.2 % — HIGH (ref 11.5–14.5)
SAO2 % BLDA: 98 % — SIGNIFICANT CHANGE UP (ref 94–98)
WBC # BLD: 11.71 K/UL — HIGH (ref 4.8–10.8)
WBC # FLD AUTO: 11.71 K/UL — HIGH (ref 4.8–10.8)

## 2018-04-18 RX ADMIN — SENNA PLUS 1 TABLET(S): 8.6 TABLET ORAL at 06:09

## 2018-04-18 RX ADMIN — SENNA PLUS 1 TABLET(S): 8.6 TABLET ORAL at 17:09

## 2018-04-18 RX ADMIN — Medication 325 MILLIGRAM(S): at 17:07

## 2018-04-18 RX ADMIN — OXYCODONE HYDROCHLORIDE 10 MILLIGRAM(S): 5 TABLET ORAL at 17:15

## 2018-04-18 RX ADMIN — OXYCODONE HYDROCHLORIDE 10 MILLIGRAM(S): 5 TABLET ORAL at 21:12

## 2018-04-18 RX ADMIN — Medication 100 MILLIGRAM(S): at 21:07

## 2018-04-18 RX ADMIN — GABAPENTIN 100 MILLIGRAM(S): 400 CAPSULE ORAL at 06:09

## 2018-04-18 RX ADMIN — Medication 325 MILLIGRAM(S): at 06:10

## 2018-04-18 RX ADMIN — GABAPENTIN 100 MILLIGRAM(S): 400 CAPSULE ORAL at 13:58

## 2018-04-18 RX ADMIN — Medication 325 MILLIGRAM(S): at 13:58

## 2018-04-18 RX ADMIN — PREGABALIN 500 MICROGRAM(S): 225 CAPSULE ORAL at 15:05

## 2018-04-18 RX ADMIN — MONTELUKAST 10 MILLIGRAM(S): 4 TABLET, CHEWABLE ORAL at 13:57

## 2018-04-18 RX ADMIN — OXYCODONE HYDROCHLORIDE 10 MILLIGRAM(S): 5 TABLET ORAL at 21:42

## 2018-04-18 RX ADMIN — Medication 0.25 MILLIGRAM(S): at 06:14

## 2018-04-18 RX ADMIN — Medication 650 MILLIGRAM(S): at 21:30

## 2018-04-18 RX ADMIN — Medication 100 MILLIGRAM(S): at 06:09

## 2018-04-18 RX ADMIN — Medication 10 MILLIGRAM(S): at 06:09

## 2018-04-18 RX ADMIN — Medication 325 MILLIGRAM(S): at 17:09

## 2018-04-18 RX ADMIN — PANTOPRAZOLE SODIUM 40 MILLIGRAM(S): 20 TABLET, DELAYED RELEASE ORAL at 06:11

## 2018-04-18 RX ADMIN — Medication 650 MILLIGRAM(S): at 14:02

## 2018-04-18 RX ADMIN — Medication 1 TABLET(S): at 13:57

## 2018-04-18 RX ADMIN — Medication 650 MILLIGRAM(S): at 06:14

## 2018-04-18 RX ADMIN — OXYCODONE HYDROCHLORIDE 10 MILLIGRAM(S): 5 TABLET ORAL at 16:00

## 2018-04-18 RX ADMIN — Medication 100 MILLIGRAM(S): at 13:58

## 2018-04-18 RX ADMIN — ALBUTEROL 1 PUFF(S): 90 AEROSOL, METERED ORAL at 08:56

## 2018-04-18 RX ADMIN — Medication 0.25 MILLIGRAM(S): at 17:14

## 2018-04-18 RX ADMIN — BENZOCAINE AND MENTHOL 1 LOZENGE: 5; 1 LIQUID ORAL at 17:14

## 2018-04-18 RX ADMIN — Medication 650 MILLIGRAM(S): at 21:13

## 2018-04-18 RX ADMIN — BENZOCAINE AND MENTHOL 1 LOZENGE: 5; 1 LIQUID ORAL at 13:59

## 2018-04-18 RX ADMIN — GABAPENTIN 100 MILLIGRAM(S): 400 CAPSULE ORAL at 21:07

## 2018-04-18 RX ADMIN — Medication 650 MILLIGRAM(S): at 14:06

## 2018-04-18 RX ADMIN — LORATADINE 10 MILLIGRAM(S): 10 TABLET ORAL at 13:56

## 2018-04-18 NOTE — PROGRESS NOTE ADULT - SUBJECTIVE AND OBJECTIVE BOX
POD#  2 S/P TKA  T(C): 36.2 (04-18-18 @ 00:08), Max: 36.4 (04-17-18 @ 07:27)  HR: 89 (04-18-18 @ 06:00) (76 - 107)  BP: 148/69 (04-18-18 @ 06:00) (112/58 - 151/76)  RR: 19 (04-18-18 @ 00:08) (18 - 19)  SpO2: --                        11.6   12.26 )-----------( 291      ( 17 Apr 2018 02:21 )             36.2     04-17    139  |  101  |  12  ----------------------------<  172<H>  4.8   |  22  |  0.8    Ca    8.7      17 Apr 2018 02:21        PTS PAIN IS CONTROLLED   WOUND IS CDI DRESSING CHANGED HEMOVAC REMOVED SEROUS DRAINAGE FROM THE DRAIN SITE  N/V/I  ABX COMPLETE  DVT PROPHYLAXIS IN PLACE ASA  REHAB IN PROGRESS WBAT  D/C PLAN PENDING

## 2018-04-18 NOTE — CONSULT NOTE ADULT - SUBJECTIVE AND OBJECTIVE BOX
LYNN LAWLER  [id number]    PMH/PSH:  PAST MEDICAL & SURGICAL HISTORY:  Asthma: LAST EPISODE &gt; 20YRS AGO  Back pain, unspecified back location, unspecified back pain laterality, unspecified chronicity  HTN (hypertension)  Arthritis: LOWER BACK  History of surgery: CORNEA REPLACEMENT    ALLERGIES:  Allergies    latex (Short breath)  Vibramycin (Unknown)    SOCIAL HABITS:    FAMILY HISTORY:   FAMILY HISTORY:  No pertinent family history in first degree relatives      MEDICATIONS   MEDICATIONS  (STANDING):  acetaminophen   Tablet. 650 milliGRAM(s) Oral every 8 hours  ALBUTerol    90 MICROgram(s) HFA Inhaler 1 Puff(s) Inhalation daily  ALPRAZolam 0.25 milliGRAM(s) Oral every 12 hours  aspirin enteric coated 325 milliGRAM(s) Oral every 12 hours  benzocaine 15 mG/menthol 3.6 mG Lozenge 1 Lozenge Oral four times a day  buDESOnide 160 MICROgram(s)/formoterol 4.5 MICROgram(s) Inhaler 2 Puff(s) Inhalation two times a day  buDESOnide 160 MICROgram(s)/formoterol 4.5 MICROgram(s) Inhaler 2 Puff(s) Inhalation two times a day  cyanocobalamin 500 MICROGram(s) Oral daily  docusate sodium 100 milliGRAM(s) Oral three times a day  enalapril 10 milliGRAM(s) Oral daily  ferrous    sulfate 325 milliGRAM(s) Oral three times a day with meals  gabapentin 100 milliGRAM(s) Oral every 8 hours  loratadine 10 milliGRAM(s) Oral daily  montelukast 10 milliGRAM(s) Oral daily  multivitamin 1 Tablet(s) Oral daily  pantoprazole   Suspension 40 milliGRAM(s) Oral before breakfast  senna 1 Tablet(s) Oral two times a day  sodium chloride 0.9%. 1000 milliLiter(s) (50 mL/Hr) IV Continuous <Continuous>    Home Medications:  enalapril 10 mg oral tablet: 1 tab(s) orally once a day (16 Apr 2018 13:56)  fluticasone 50 mcg inhalation powder: 1 puff(s) inhaled 2 times a day (16 Apr 2018 13:56)  loratadine 10 mg oral tablet: 1 tab(s) orally once a day (16 Apr 2018 13:56)  magnesium chloride: 400 milligram(s) orally once a day (16 Apr 2018 13:56)  montelukast 10 mg oral tablet: 1 tab(s) orally once a day (16 Apr 2018 13:56)  Multiple Vitamins oral tablet: 1 tab(s) orally once a day (16 Apr 2018 13:56)  omeprazole 20 mg oral delayed release capsule: 1 cap(s) orally once a day (16 Apr 2018 13:56)  Oyster Shell 500 (1250 mg calcium carbonate) oral tablet: 1 tab(s) orally 3 times a day (16 Apr 2018 13:56)  Symbicort 160 mcg-4.5 mcg/inh inhalation aerosol: 2 puff(s) inhaled 2 times a day (16 Apr 2018 13:56)  Ventolin 90 mcg/inh inhalation aerosol with adapter: inhaled prn (16 Apr 2018 13:56)  Vitamin B12 500 mcg oral tablet: 1 tab(s) orally once a day (16 Apr 2018 13:56)      REVIEW OF SYSTEMS:  Elements of review of systems are all negative or non-applicable except as below:    VITALS:   Vital Signs Last 24 Hrs  T(C): 37.4 (18 Apr 2018 08:22), Max: 37.4 (18 Apr 2018 08:22)  T(F): 99.3 (18 Apr 2018 08:22), Max: 99.3 (18 Apr 2018 08:22)  HR: 126 (18 Apr 2018 08:22) (76 - 126)  BP: 135/70 (18 Apr 2018 08:22) (128/60 - 151/76)  RR: 18 (18 Apr 2018 08:22) (18 - 19)  SpO2: 96% Room Air    LABS:             < from: Xray Chest 2 Views PA/Lat (04.11.18 @ 17:04) >  Impression:    No radiographic evidenceof cardiopulmonary disease.    < end of copied text >             10.2   11.71 )-----------( 304      ( 18 Apr 2018 09:33 )             31.6   Hemoglobin Trend:  Hemoglobin: 10.2 g/dL (04-18 @ 09:33)  Hemoglobin: 11.6 g/dL (04-17 @ 02:21)      04-17    139  |  101  |  12  ----------------------------<  172<H>  4.8   |  22  |  0.8    Ca    8.7      17 Apr 2018 02:21    Culture - Urine (collected 04-11-18 @ 17:00)  Source: .Urine Clean Catch (Midstream)  Final Report (04-12-18 @ 22:25):    No growth      IMAGING & EKG:    < from: 12 Lead ECG (04.18.18 @ 07:53) >  Atrial Rate 122 BPM  P-R Interval 128 ms  QTC Calculation(Bezet) 416 ms  Diagnosis: Sinus tachycardia    < from: Xray Chest 2 Views PA/Lat (04.11.18 @ 17:04) >    No radiographic evidenceof cardiopulmonary disease. LYNN LAWLER  [id number]    PMH/PSH:  PAST MEDICAL & SURGICAL HISTORY:  Asthma: LAST EPISODE &gt; 20YRS AGO  Back pain, unspecified back location, unspecified back pain laterality, unspecified chronicity  HTN (hypertension)  Arthritis: LOWER BACK  History of surgery: CORNEA REPLACEMENT    ALLERGIES:  Allergies:    latex (Short breath)  Vibramycin (Unknown)    SOCIAL HABITS:  Denies smoking    FAMILY HISTORY:   FAMILY HISTORY:  No pertinent family history in first degree relatives      MEDICATIONS   MEDICATIONS  (STANDING):  acetaminophen   Tablet. 650 milliGRAM(s) Oral every 8 hours  ALBUTerol    90 MICROgram(s) HFA Inhaler 1 Puff(s) Inhalation daily  ALPRAZolam 0.25 milliGRAM(s) Oral every 12 hours  aspirin enteric coated 325 milliGRAM(s) Oral every 12 hours  benzocaine 15 mG/menthol 3.6 mG Lozenge 1 Lozenge Oral four times a day  buDESOnide 160 MICROgram(s)/formoterol 4.5 MICROgram(s) Inhaler 2 Puff(s) Inhalation two times a day  buDESOnide 160 MICROgram(s)/formoterol 4.5 MICROgram(s) Inhaler 2 Puff(s) Inhalation two times a day  cyanocobalamin 500 MICROGram(s) Oral daily  docusate sodium 100 milliGRAM(s) Oral three times a day  enalapril 10 milliGRAM(s) Oral daily  ferrous    sulfate 325 milliGRAM(s) Oral three times a day with meals  gabapentin 100 milliGRAM(s) Oral every 8 hours  loratadine 10 milliGRAM(s) Oral daily  montelukast 10 milliGRAM(s) Oral daily  multivitamin 1 Tablet(s) Oral daily  pantoprazole   Suspension 40 milliGRAM(s) Oral before breakfast  senna 1 Tablet(s) Oral two times a day  sodium chloride 0.9%. 1000 milliLiter(s) (50 mL/Hr) IV Continuous <Continuous>    Home Medications:  enalapril 10 mg oral tablet: 1 tab(s) orally once a day (16 Apr 2018 13:56)  fluticasone 50 mcg inhalation powder: 1 puff(s) inhaled 2 times a day (16 Apr 2018 13:56)  loratadine 10 mg oral tablet: 1 tab(s) orally once a day (16 Apr 2018 13:56)  magnesium chloride: 400 milligram(s) orally once a day (16 Apr 2018 13:56)  montelukast 10 mg oral tablet: 1 tab(s) orally once a day (16 Apr 2018 13:56)  Multiple Vitamins oral tablet: 1 tab(s) orally once a day (16 Apr 2018 13:56)  omeprazole 20 mg oral delayed release capsule: 1 cap(s) orally once a day (16 Apr 2018 13:56)  Oyster Shell 500 (1250 mg calcium carbonate) oral tablet: 1 tab(s) orally 3 times a day (16 Apr 2018 13:56)  Symbicort 160 mcg-4.5 mcg/inh inhalation aerosol: 2 puff(s) inhaled 2 times a day (16 Apr 2018 13:56)  Ventolin 90 mcg/inh inhalation aerosol with adapter: inhaled prn (16 Apr 2018 13:56)  Vitamin B12 500 mcg oral tablet: 1 tab(s) orally once a day (16 Apr 2018 13:56)      REVIEW OF SYSTEMS:  Elements of review of systems are all negative or non-applicable except as below:    VITALS:   Vital Signs Last 24 Hrs  T(C): 37.4 (18 Apr 2018 08:22), Max: 37.4 (18 Apr 2018 08:22)  T(F): 99.3 (18 Apr 2018 08:22), Max: 99.3 (18 Apr 2018 08:22)  HR: 126 (18 Apr 2018 08:22) (76 - 126)  BP: 135/70 (18 Apr 2018 08:22) (128/60 - 151/76)  RR: 18 (18 Apr 2018 08:22) (18 - 19)  SpO2: 96% Room Air    LABS:                          10.2   11.71 )-----------( 304      ( 18 Apr 2018 09:33 )             31.6   Hemoglobin Trend:  Hemoglobin: 10.2 g/dL (04-18 @ 09:33)  Hemoglobin: 11.6 g/dL (04-17 @ 02:21)      04-17    139  |  101  |  12  ----------------------------<  172<H>  4.8   |  22  |  0.8    Ca    8.7      17 Apr 2018 02:21    Culture - Urine (collected 04-11-18 @ 17:00)  Source: .Urine Clean Catch (Midstream)  Final Report (04-12-18 @ 22:25):    No growth      IMAGING & EKG:    < from: 12 Lead ECG (04.18.18 @ 07:53) >  Atrial Rate 122 BPM  P-R Interval 128 ms  QTC Calculation(Bezet) 416 ms  Diagnosis: Sinus tachycardia    < from: Xray Chest 2 Views PA/Lat (04.11.18 @ 17:04) >  No radiographic evidenceof cardiopulmonary disease.      Blood Gas Profile - Arterial (04.18.18 @ 11:00)    pH, Arterial: 7.46    pCO2, Arterial: 37 mmHg    pO2, Arterial: 100 mmHg    HCO3, Arterial: 26 mmoL/L    Base Excess, Arterial: 2.3 mmoL/L    Oxygen Saturation, Arterial: 98 %    < from: VA Duplex Lower Ext Vein Scan, Bilat (04.18.18 @ 11:36) >  No evidence of deep venous thrombosis or superficial thrombophlebitis in   the bilateral lower extremities      < from: Xray Chest 2 Views PA/Lat (04.11.18 @ 17:04) >  No radiographic evidenceof cardiopulmonary disease.      PHYSICAL EXAM:    GENERAL: A&O x3,  in NAD/P currently, a bit anxious. Family at bedside.  NECK: No Swelling, No JVD.   CHEST/LUNG: Good air entry, No wheezing (anterior auscultation)  HEART: RRR, No murmurs.  ABDOMEN: Soft, Nontender, Nondistended.  EXTREMITIES:  Peripheral Pulses Present , No clubbing, No edema, (+) wraps KIMMY knee

## 2018-04-18 NOTE — CONSULT NOTE ADULT - ASSESSMENT
1.	sinus tachycardia, likely multifactorial (pain, anxiety)  2.	PE unlikely at this time:   ·	No Hypoxemia  ·	No CP  ·	No AAG  ·	No DVT  ·	No EKG changes in favor of PE  3.    S/P TKA, post op care and management as per ortho team/ Incentive venkata/ PT & Rehab.  4.    Stable Asthma/ HTN on Tx    ·	Plan:  ·	Analgesics/ PT& Rehab  ·	Wound care   ·	DVT proph choice at preference of primary ortho team  ·	d/w family members at bedside    Please recall PRN 1.	sinus tachycardia, likely multifactorial (pain, anxiety)  2.	PE unlikely at this time:   ·	No Hypoxemia  ·	No CP  ·	No AAG  ·	No DVT  ·	No EKG changes in favor of PE  3.    S/P TKA, post op care and management as per ortho team/ Incentive venkata/ PT & Rehab.  4.    Stable Asthma/ HTN on Tx    ·	Plan:  ·	Analgesics/ PT& Rehab  ·	Wound care   ·	DVT proph choice at preference of primary ortho team  ·	d/w family members at bedside  ·	Incentive spirometry    Please recall PRN

## 2018-04-18 NOTE — PROGRESS NOTE ADULT - ATTENDING COMMENTS
pt s&e  agree with above  HR elevated  denies SOB and CP but does report dizziness    dressing c/d  bijan neg    will get duplex

## 2018-04-18 NOTE — PROGRESS NOTE ADULT - SUBJECTIVE AND OBJECTIVE BOX
F/U tachycardia  v doppler neg  ABG - ( 18 Apr 2018 11:00 )  pH: 7.46  /  pCO2: 37    /  pO2: 100   / HCO3: 26    / Base Excess: 2.3   /  SaO2: 98    cxr report pending     final medical recs pending F/U tachycardia  v doppler neg  ABG - ( 18 Apr 2018 11:00 )  pH: 7.46  /  pCO2: 37    /  pO2: 100   / HCO3: 26    / Base Excess: 2.3   /  SaO2: 98                          10.2   11.71 )-----------( 304      ( 18 Apr 2018 09:33 )             31.6     cxr report pending     final medical recs pending

## 2018-04-18 NOTE — PROGRESS NOTE ADULT - SUBJECTIVE AND OBJECTIVE BOX
F/U TKA POD#2 TACHYCARDIA  PTAOX3  NO C/O SOB CP  AM HR 76-89  NOW IN BED  BPM O2 SAT ON RM AIR 96% /70   RPT EKG -  ST  NICK DOPPLER /ABG/ CXR ORDERED  DISCUSSED THIS CASE WITH THE HOSPITALIST TEAM   THEY WILL SEE AND EVAL./

## 2018-04-19 RX ADMIN — PREGABALIN 500 MICROGRAM(S): 225 CAPSULE ORAL at 13:26

## 2018-04-19 RX ADMIN — Medication 650 MILLIGRAM(S): at 21:58

## 2018-04-19 RX ADMIN — BENZOCAINE AND MENTHOL 1 LOZENGE: 5; 1 LIQUID ORAL at 05:28

## 2018-04-19 RX ADMIN — Medication 325 MILLIGRAM(S): at 09:04

## 2018-04-19 RX ADMIN — Medication 650 MILLIGRAM(S): at 21:30

## 2018-04-19 RX ADMIN — Medication 325 MILLIGRAM(S): at 11:30

## 2018-04-19 RX ADMIN — PANTOPRAZOLE SODIUM 40 MILLIGRAM(S): 20 TABLET, DELAYED RELEASE ORAL at 09:04

## 2018-04-19 RX ADMIN — GABAPENTIN 100 MILLIGRAM(S): 400 CAPSULE ORAL at 21:58

## 2018-04-19 RX ADMIN — OXYCODONE HYDROCHLORIDE 5 MILLIGRAM(S): 5 TABLET ORAL at 17:27

## 2018-04-19 RX ADMIN — SENNA PLUS 1 TABLET(S): 8.6 TABLET ORAL at 17:16

## 2018-04-19 RX ADMIN — Medication 325 MILLIGRAM(S): at 17:16

## 2018-04-19 RX ADMIN — LORATADINE 10 MILLIGRAM(S): 10 TABLET ORAL at 11:24

## 2018-04-19 RX ADMIN — Medication 100 MILLIGRAM(S): at 13:27

## 2018-04-19 RX ADMIN — Medication 0.25 MILLIGRAM(S): at 05:31

## 2018-04-19 RX ADMIN — Medication 100 MILLIGRAM(S): at 21:58

## 2018-04-19 RX ADMIN — Medication 100 MILLIGRAM(S): at 05:27

## 2018-04-19 RX ADMIN — GABAPENTIN 100 MILLIGRAM(S): 400 CAPSULE ORAL at 13:27

## 2018-04-19 RX ADMIN — Medication 10 MILLIGRAM(S): at 05:27

## 2018-04-19 RX ADMIN — Medication 650 MILLIGRAM(S): at 05:28

## 2018-04-19 RX ADMIN — Medication 325 MILLIGRAM(S): at 05:27

## 2018-04-19 RX ADMIN — MONTELUKAST 10 MILLIGRAM(S): 4 TABLET, CHEWABLE ORAL at 11:24

## 2018-04-19 RX ADMIN — BUDESONIDE AND FORMOTEROL FUMARATE DIHYDRATE 2 PUFF(S): 160; 4.5 AEROSOL RESPIRATORY (INHALATION) at 09:05

## 2018-04-19 RX ADMIN — OXYCODONE HYDROCHLORIDE 10 MILLIGRAM(S): 5 TABLET ORAL at 14:25

## 2018-04-19 RX ADMIN — GABAPENTIN 100 MILLIGRAM(S): 400 CAPSULE ORAL at 05:27

## 2018-04-19 RX ADMIN — OXYCODONE HYDROCHLORIDE 10 MILLIGRAM(S): 5 TABLET ORAL at 17:27

## 2018-04-19 RX ADMIN — OXYCODONE HYDROCHLORIDE 5 MILLIGRAM(S): 5 TABLET ORAL at 11:30

## 2018-04-19 RX ADMIN — Medication 0.25 MILLIGRAM(S): at 17:16

## 2018-04-19 RX ADMIN — OXYCODONE HYDROCHLORIDE 10 MILLIGRAM(S): 5 TABLET ORAL at 22:32

## 2018-04-19 RX ADMIN — OXYCODONE HYDROCHLORIDE 10 MILLIGRAM(S): 5 TABLET ORAL at 22:02

## 2018-04-19 RX ADMIN — OXYCODONE HYDROCHLORIDE 5 MILLIGRAM(S): 5 TABLET ORAL at 17:16

## 2018-04-19 RX ADMIN — SENNA PLUS 1 TABLET(S): 8.6 TABLET ORAL at 05:27

## 2018-04-19 RX ADMIN — Medication 1 TABLET(S): at 11:24

## 2018-04-19 RX ADMIN — ALBUTEROL 1 PUFF(S): 90 AEROSOL, METERED ORAL at 09:05

## 2018-04-19 NOTE — PROGRESS NOTE ADULT - SUBJECTIVE AND OBJECTIVE BOX
pt s&e  doing better, HR down to 91  pain controlled  dressing c/d  plan to continue PT  DVT proph  pain control

## 2018-04-20 VITALS
RESPIRATION RATE: 18 BRPM | HEART RATE: 118 BPM | DIASTOLIC BLOOD PRESSURE: 73 MMHG | TEMPERATURE: 97 F | SYSTOLIC BLOOD PRESSURE: 145 MMHG

## 2018-04-20 RX ORDER — OXYCODONE HYDROCHLORIDE 5 MG/1
1 TABLET ORAL
Qty: 42 | Refills: 0 | OUTPATIENT
Start: 2018-04-20 | End: 2018-04-26

## 2018-04-20 RX ORDER — OXYCODONE HYDROCHLORIDE 5 MG/1
1 TABLET ORAL
Qty: 24 | Refills: 0 | OUTPATIENT
Start: 2018-04-20 | End: 2018-04-25

## 2018-04-20 RX ORDER — ASPIRIN/CALCIUM CARB/MAGNESIUM 324 MG
1 TABLET ORAL
Qty: 60 | Refills: 0 | OUTPATIENT
Start: 2018-04-20 | End: 2018-05-19

## 2018-04-20 RX ADMIN — Medication 325 MILLIGRAM(S): at 08:33

## 2018-04-20 RX ADMIN — Medication 100 MILLIGRAM(S): at 06:55

## 2018-04-20 RX ADMIN — PANTOPRAZOLE SODIUM 40 MILLIGRAM(S): 20 TABLET, DELAYED RELEASE ORAL at 08:34

## 2018-04-20 RX ADMIN — Medication 650 MILLIGRAM(S): at 06:55

## 2018-04-20 RX ADMIN — Medication 10 MILLIGRAM(S): at 06:54

## 2018-04-20 RX ADMIN — ALBUTEROL 1 PUFF(S): 90 AEROSOL, METERED ORAL at 09:29

## 2018-04-20 RX ADMIN — SENNA PLUS 1 TABLET(S): 8.6 TABLET ORAL at 06:54

## 2018-04-20 RX ADMIN — GABAPENTIN 100 MILLIGRAM(S): 400 CAPSULE ORAL at 06:54

## 2018-04-20 RX ADMIN — Medication 325 MILLIGRAM(S): at 06:55

## 2018-04-20 NOTE — DISCHARGE NOTE ADULT - MEDICATION SUMMARY - MEDICATIONS TO TAKE
I will START or STAY ON the medications listed below when I get home from the hospital:    oxyCODONE 10 mg oral tablet  -- 1 tab(s) by mouth every 6 hours, As Needed -Moderate Pain MDD:4  -- Indication: For pain     aspirin 325 mg oral delayed release tablet  -- 1 tab(s) by mouth every 12 hours  -- Indication: For blood clot prevention    enalapril 10 mg oral tablet  -- 1 tab(s) by mouth once a day  -- Indication: For home med    Oyster Shell 500 (1250 mg calcium carbonate) oral tablet  -- 1 tab(s) by mouth 3 times a day  -- Indication: For home med    loratadine 10 mg oral tablet  -- 1 tab(s) by mouth once a day  -- Indication: For home med    Ventolin 90 mcg/inh inhalation aerosol with adapter  -- inhaled prn  -- Indication: For home med    Symbicort 160 mcg-4.5 mcg/inh inhalation aerosol  -- 2 puff(s) inhaled 2 times a day  -- Indication: For home med    montelukast 10 mg oral tablet  -- 1 tab(s) by mouth once a day  -- Indication: For home med    magnesium chloride  -- 400 milligram(s) by mouth once a day  -- Indication: For home med    omeprazole 20 mg oral delayed release capsule  -- 1 cap(s) by mouth once a day  -- Indication: For home med     fluticasone 50 mcg inhalation powder  -- 1 puff(s) inhaled 2 times a day  -- Indication: For home med    Multiple Vitamins oral tablet  -- 1 tab(s) by mouth once a day  -- Indication: For home med    Vitamin B12 500 mcg oral tablet  -- 1 tab(s) by mouth once a day  -- Indication: For home med

## 2018-04-20 NOTE — DISCHARGE NOTE ADULT - ADDITIONAL INSTRUCTIONS
follow up with dr randle as outpatient. call for post op appt. Call surgeon if you have any fevers, wound redness, wound drainage or if you have any questions or concerns

## 2018-04-20 NOTE — DISCHARGE NOTE ADULT - CARE PROVIDER_API CALL
Jony Guerra), Orthopaedic Surgery  [facility] [place] [address]  Phone: [phone]  Fax: [phone]

## 2018-04-20 NOTE — DISCHARGE NOTE ADULT - CARE PLAN
Principal Discharge DX:	Arthritis  Goal:	return to previous level of function  Assessment and plan of treatment:	physical therapy  to improve function .

## 2018-04-20 NOTE — DISCHARGE NOTE ADULT - PATIENT PORTAL LINK FT
You can access the [facility] Patient Portal, offered by [facility], by registering with the following website: [email or web address]

## 2018-04-20 NOTE — PROGRESS NOTE ADULT - SUBJECTIVE AND OBJECTIVE BOX
ORTHO PROGRESS NOTE       63yFemale POD # 4     S/P left TKA    Patient seen and examined at bedside . The patient is awake and alert in NAD. No complaints of chest pain, SOB, N/V. post op tachycardia related to pain and anxiety.     Vital Signs Last 24 Hrs  T(C): 36 (20 Apr 2018 07:35), Max: 38.3 (20 Apr 2018 00:00)  T(F): 96.8 (20 Apr 2018 07:35), Max: 101 (20 Apr 2018 00:00)  HR: 118 (20 Apr 2018 07:35) (118 - 125)  BP: 145/73 (20 Apr 2018 07:35) (140/66 - 145/73)  BP(mean): --  RR: 18 (20 Apr 2018 07:35) (18 - 20)  SpO2: --                          10.2   11.71 )-----------( 304      ( 18 Apr 2018 09:33 )             31.6     DVT ppx : aspirin    MEDICATIONS  (STANDING):  acetaminophen   Tablet. 650 milliGRAM(s) Oral every 8 hours  ALBUTerol    90 MICROgram(s) HFA Inhaler 1 Puff(s) Inhalation daily  ALPRAZolam 0.25 milliGRAM(s) Oral every 12 hours  aspirin enteric coated 325 milliGRAM(s) Oral every 12 hours  benzocaine 15 mG/menthol 3.6 mG Lozenge 1 Lozenge Oral four times a day  buDESOnide 160 MICROgram(s)/formoterol 4.5 MICROgram(s) Inhaler 2 Puff(s) Inhalation two times a day  buDESOnide 160 MICROgram(s)/formoterol 4.5 MICROgram(s) Inhaler 2 Puff(s) Inhalation two times a day  cyanocobalamin 500 MICROGram(s) Oral daily  docusate sodium 100 milliGRAM(s) Oral three times a day  enalapril 10 milliGRAM(s) Oral daily  ferrous    sulfate 325 milliGRAM(s) Oral three times a day with meals  gabapentin 100 milliGRAM(s) Oral every 8 hours  loratadine 10 milliGRAM(s) Oral daily  montelukast 10 milliGRAM(s) Oral daily  multivitamin 1 Tablet(s) Oral daily  pantoprazole   Suspension 40 milliGRAM(s) Oral before breakfast  senna 1 Tablet(s) Oral two times a day  sodium chloride 0.9%. 1000 milliLiter(s) (50 mL/Hr) IV Continuous <Continuous>    MEDICATIONS  (PRN):  bisacodyl Suppository 10 milliGRAM(s) Rectal daily PRN If no bowel movement by POD#2  metoclopramide Injectable 10 milliGRAM(s) IV Push every 6 hours PRN Nausea and/or Vomiting  morphine  - Injectable 2 milliGRAM(s) IV Push every 4 hours PRN Severe Pain (7 - 10)  oxyCODONE    IR 10 milliGRAM(s) Oral every 4 hours PRN Moderate Pain  oxyCODONE    IR 5 milliGRAM(s) Oral every 4 hours PRN Mild Pain      PE:   left knee dressing C/D/I          Compartments soft         NVI, SILT           A/P: 63yFemale POD # 4   S/P  left TKA            OOB to Chair            Physical Therapy           Pain control            Incentive Spirometry            DVT Prophylaxis            Discharge planning -home today

## 2018-04-24 DIAGNOSIS — G89.18 OTHER ACUTE POSTPROCEDURAL PAIN: ICD-10-CM

## 2018-04-24 DIAGNOSIS — J45.909 UNSPECIFIED ASTHMA, UNCOMPLICATED: ICD-10-CM

## 2018-04-24 DIAGNOSIS — F41.9 ANXIETY DISORDER, UNSPECIFIED: ICD-10-CM

## 2018-04-24 DIAGNOSIS — M17.12 UNILATERAL PRIMARY OSTEOARTHRITIS, LEFT KNEE: ICD-10-CM

## 2018-04-24 DIAGNOSIS — I10 ESSENTIAL (PRIMARY) HYPERTENSION: ICD-10-CM

## 2018-05-23 NOTE — ASU PATIENT PROFILE, ADULT - AS SC BRADEN ACTIVITY
Yonatan Helms  : 1976  Payor: Ester Shelby / Plan: [address] HMO / Product Type: HMO /  [address] at [place] [address] Therapy  [address], [address]  [phone]   [phone]         OUTPATIENT PHYSICAL THERAPY:Daily Note 2018    ICD-10: Treatment Diagnosis: pain in thoracic spine M54.6, muscle weakness M62.81,     Precautions/Allergies:   Shellfish derived   Fall Risk Score: 0 (? 5 = High Risk)  MD Orders: Eval and treat MEDICAL/REFERRING DIAGNOSIS:  Pain in thoracic spine [M54.6]  Chest pain on breathing [R07.1]   DATE OF ONSET: 2018  REFERRING PHYSICIAN: Mira Huitron, Aleksandr Parish, *  RETURN PHYSICIAN APPOINTMENT: as needed     INITIAL ASSESSMENT:  Ms. Ren Quinn presents with increased left chest pain just inferior to the clavicle and then her pain radiates down into the arm slightly and into the shoulder blades and thoracic spine. She reported no injury to the region and the pain worsened in January that she did undergo ER evaluation which came back negative for any cardiac history. She has also had a follow-up with cardiologist which revealed a slight murmur, but echo and treadmill stress testing were normal.  She is reporting her prescribed NSAID is helping to relieve some of the pain. She continues to report folding laundry, housework and computer work can increase her symptoms. She reports lying down relieves her symptoms. She would like to try therapy to see if her symptoms improve. PROBLEM LIST (Impacting functional limitations):  1. Decreased Strength  2. Decreased ADL/Functional Activities  3. Increased Pain  4. Decreased Activity Tolerance INTERVENTIONS PLANNED:  1. Heat  2. Home Exercise Program (HEP)  3. Manual Therapy  4. Therapeutic Exercise/Strengthening  5. Ultrasound (US)   TREATMENT PLAN:  Effective Dates: 2018 TO 2018 (60 days).  Frequency/Duration: 2 times a week for 60 Days    GOALS: (Goals have been discussed and agreed upon with patient.)  Short-Term Functional Goals: Time Frame: 4 weeks  1. Establish independent HEP with no cueing. 2. Pt will be able to report 5/10 pain rating or less with ADL's.  3. Pt will be able to work on her computer for 20 minutes with less pain. Discharge Goals: Time Frame: 8 weeks  1. Pt will be able to report 3/10 pain rating or less with ADL's.  2. Pt will be able to fold laundry, vacuum up to 20 minutes with no increase in symptoms. 3. Pt will be able to increase strength in bilateral UE's by at least one full grade for improved lifting abilities. Rehabilitation Potential For Stated Goals: Good  Regarding Isaakmonica Valentine's therapy, I certify that the treatment plan above will be carried out by a therapist or under their direction. Thank you for this referral,  Alia Palm, PT                 The information in this section was collected on 4/30/18 (except where otherwise noted). HISTORY:   History of Present Injury/Illness (Reason for Referral):  Ms. Sheng Dumont presents with increased left chest pain just inferior to the clavicle and then her pain radiates down into the arm slightly and into the shoulder blades and thoracic spine. She reported no injury to the region and the pain worsened in January that she did undergo ER evaluation which came back negative for any cardiac history. She has also had a follow-up with cardiologist which revealed a slight murmur, but echo and treadmill stress testing were normal.  She is reporting her prescribed NSAID is helping to relieve some of the pain. She continues to report folding laundry, housework and computer work can increase her symptoms. She reports lying down relieves her symptoms. She would like to try therapy to see if her symptoms improve.   Past Medical History/Comorbidities:   Ms. Sheng Dumont  has a past medical history of Dyspepsia and other specified disorders of function of stomach; Irregular heart beat; and Obesity (BMI 30-39.9). She also has no past medical history of Difficult intubation; Malignant hyperthermia due to anesthesia; Nausea & vomiting; Pseudocholinesterase deficiency; or Unspecified adverse effect of anesthesia. Ms. Darrin Nash  has a past surgical history that includes hx orthopaedic; hx  section; hx leep procedure; hx tonsillectomy (childhood); and hx cholecystectomy. Social History/Living Environment:     pt lives with supportive family. She has a 8year old sone and 15year old son  Prior Level of Function/Work/Activity:  Pt works part-time as a   Dominant Side:         RIGHT  Current Medications:       Current Outpatient Prescriptions:     meloxicam (MOBIC) 15 mg tablet, Take 1 Tab by mouth daily. , Disp: 30 Tab, Rfl: 1    cyclobenzaprine (FLEXERIL) 10 mg tablet, Take 1 Tab by mouth nightly., Disp: 30 Tab, Rfl: 1    Magnesium Oxide 500 mg cap, Take  by mouth., Disp: , Rfl:    Date Last Reviewed:  2018     Number of Personal Factors/Comorbidities that affect the Plan of Care: 1-2: MODERATE COMPLEXITY   EXAMINATION:   Palpation:          Increased pain just inferior to clavicle around pec major  ROM:        Bilateral UE motion WFL  Bilateral UE ROM WFL  Cervical ROM WFL                                    Strength:     R UE flex 4/5, abd 4-/5, ER 4-/5, IR 4-/5, biceps 4/5         L UE flex 3+/5, abd 3+/5, ER 3+/5, IR 3+/5, biceps 4-/5            Special Tests: negative Sherlie Larsen and negative empty can for left shoulder. She tested positive for pain with resisted pectoral testing  Neurological Screen: na  Balance:  na   Body Structures Involved:  1. Muscles  2. Ligaments Body Functions Affected:  1. Sensory/Pain  2. Neuromusculoskeletal  3. Movement Related Activities and Participation Affected:  1. General Tasks and Demands  2. Self Care  3. Domestic Life  4. Interpersonal Interactions and Relationships  5.  Community, Social and [place]   Number of elements (examined above) that affect the Plan of Care: 4+: HIGH COMPLEXITY   CLINICAL PRESENTATION:   Presentation: Evolving clinical presentation with changing clinical characteristics: MODERATE COMPLEXITY   CLINICAL DECISION MAKING:   Outcome Measure: Tool Used: Disabilities of the Arm, Shoulder and Hand (DASH) Questionnaire - Quick Version  Score:  Initial: 26/55  Most Recent: X/55 (Date: -- )   Interpretation of Score: The DASH is designed to measure the activities of daily living in person's with upper extremity dysfunction or pain. Each section is scored on a 1-5 scale, 5 representing the greatest disability. The scores of each section are added together for a total score of 55. Score 11 12-19 20-28 29-37 38-45 46-54 55   Modifier CH CI CJ CK CL CM CN           Medical Necessity:   · Patient is expected to demonstrate progress in strength and pain levels to increase her ability to use the left arm for housework and computer work. Reason for Services/Other Comments:  · Patient continues to require skilled intervention due to ongoing pain in the left chest along pectorals that radiates into the shoulder blades. this pain affects her ability to perform housework and her computer work. Use of outcome tool(s) and clinical judgement create a POC that gives a: Questionable prediction of patient's progress: MODERATE COMPLEXITY            TREATMENT:   (In addition to Assessment/Re-Assessment sessions the following treatments were rendered)  Pre-treatment Symptoms/Complaints:  Pt reporting she has noticed that therapy has helped some, but she was hoping it would have helped more. She feels good for several hours after leaving therapy, but then the pain returns. She reported feeling more into her neck and along her left ribcage. Pain: Initial:   Pain Intensity 1: 4  Post Session:  4/10     THERAPEUTIC EXERCISE: (30 minutes):  Exercises per grid below to improve mobility and strength.   Required minimal verbal cues to promote proper body mechanics. Progressed resistance, range, repetitions and complexity of movement as indicated. UBE x 5 min  Biceps stretching with arm on doorway 3x hold 20 sec  t-bar overhead stretch x 10 hold 5 sec at top  t-bar ext stretching x 10 hold 10 sec  IR 15# x 20  Rows 20# x 20  Punches 15# x 20-held  Double arm rows 40# x 20  Shoulder pull downs 30# x 20  Prone shoulder ext 2# x 20-held  Prone shoulder rows 4# x 20held  Prone h. abd with light assistance x 20 2#  Prone scap retract to work on scapular depression-mild cuing required  Sidelying ER 3# x 20-held  3 way arm raises on swiss ball x 10  Manual Therapy (   15 min   ): STM to pectoral region in supine along with some stretching to the pectorals and to left rhomboid region    Therapeutic Modalities:MHP x 10 minutes post treatment for pain relief    HEP: continue as directed  Access Code: [email or web address]   URL: [email or web address]   Date: 04/30/2018   Prepared by: Micaela [place]     Exercises   Corner Pec Major Stretch - 5 reps - 1 sets - 2x daily   Standing Bicep Stretch at Wall - 5 reps - 1 sets - 2x daily   Chest and Bicep Stretch - Arms Behind Back - 5 reps - 1 sets - 2x daily   Standing Shoulder Posterior Capsule Stretch - 5 reps - 1 sets - 2x daily   Standing Lower Cervical and Upper Thoracic Stretch - 5 reps - 1 sets - 2x daily    Treatment/Session Assessment:    · Response to Treatment:  Pt reporting therapy provides her with several hours of relief, but then the pain returns. She continues to take the NSAID and reports she must use it to help alleviate her pain. She will be contacting referring MD to see what next step might be. It is unclear the origin of her pain- it appears muscular and postural related at times, but it is unclear if there is any cervical DDD or shoulder pathology. Pt has been able to do some of her normal home tasks, but if she overdoes it she can feel increased pain and must take muscle relaxer.   She progressed well with today's treatment and felt some better post treatment. · Compliance with Program/Exercises: Will assess as treatment progresses. · Recommendations/Intent for next treatment session: \"Next visit will focus on advancements to more challenging activities\".   Total Treatment Duration: 55 min  PT Patient Time In/Time Out  Time In: 1215  Time Out: 0110  Treatment number [address], PT (3) walks occasionally

## 2021-03-24 NOTE — H&P PST ADULT - ADDITIONAL PE
normal appearance, without tenderness upon palpation, no deformities, no cervical lymphadenopathy, no masses, no thyroid nodules, Thyroid normal size, no JVD, thyroid nontender PT DENIES HAVING ANY LOOSE TEETH.

## 2024-05-08 NOTE — H&P PST ADULT - PAIN DESCRIPTION (FREQUENCY/QUALITY), PROFILE
From: Charla Mi  To: Charla Riojas  Sent: 5/7/2024 7:19 PM CDT  Subject: Medication refill    Good evening!     I sent a request for a refill for Trazodone two months ago; it never got filled, and I just realized it went to Dr. Mar. Would I need to come in for this to be refilled? I am having some problems with sleep and anxiety: they are in competition with each other.    Thank you,    occasional

## 2025-03-08 NOTE — DISCHARGE NOTE ADULT - NS MD DC FALL RISK RISK
Abnormal HR For information on Fall & Injury Prevention, visit [email or web address]